# Patient Record
Sex: MALE | Race: OTHER | Employment: STUDENT | URBAN - NONMETROPOLITAN AREA
[De-identification: names, ages, dates, MRNs, and addresses within clinical notes are randomized per-mention and may not be internally consistent; named-entity substitution may affect disease eponyms.]

---
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
## 2019-07-17 ENCOUNTER — TELEPHONE (OUTPATIENT)
Dept: FAMILY MEDICINE CLINIC | Facility: CLINIC | Age: 19
End: 2019-07-17

## 2019-07-17 NOTE — TELEPHONE ENCOUNTER
Spoke with pt who requested to have copy of PFT from last year emailed or faxed to him. Advised pt that [facility] are not at liberty to fax or email records from another facility and that he would have to contact [address].  Patient notified and verbalized under

## 2019-07-29 ENCOUNTER — TELEPHONE (OUTPATIENT)
Dept: FAMILY MEDICINE CLINIC | Facility: CLINIC | Age: 19
End: 2019-07-29

## 2019-07-29 DIAGNOSIS — Z53.20 SCREENING FOR MALIGNANT NEOPLASM OF LUNG DECLINED BY PATIENT: Primary | ICD-10-CM

## 2019-07-29 DIAGNOSIS — R05.3 CHRONIC COUGH: ICD-10-CM

## 2019-07-29 DIAGNOSIS — Z00.00 ROUTINE ADULT HEALTH MAINTENANCE: ICD-10-CM

## 2019-07-29 NOTE — TELEPHONE ENCOUNTER
I dont know what code to use    And   I think we can order the challenge alone without the regular pft-----    I would try wellness exam perhaps?

## 2019-07-29 NOTE — TELEPHONE ENCOUNTER
PT NEEDS A REFERRAL TO GET A METHACHOLINE TEST COMPLETED . PT IS TRYING TO JOIN THE , AND THEY TOLD PT HE NEEDED TO GET THIS TEST DONE.  PLEASE CALL

## 2019-07-29 NOTE — TELEPHONE ENCOUNTER
Pt states that he would like to go to [employer].  Pt provided [employer] central scheduling fax number  [phone] faxed to number provided

## 2019-08-08 ENCOUNTER — TELEPHONE (OUTPATIENT)
Dept: FAMILY MEDICINE CLINIC | Facility: CLINIC | Age: 19
End: 2019-08-08

## 2019-08-08 NOTE — TELEPHONE ENCOUNTER
Aura from [facility] pulmonary scheduling called to say that methacholine test needs to be personally signed by Dr. Matt Danielle. Also pt needs other lung function tests done prior. Advised that pt had PFT in 2017.  Neal Watkins states that she will has to talk wit

## 2019-08-29 ENCOUNTER — OFFICE VISIT (OUTPATIENT)
Dept: FAMILY MEDICINE CLINIC | Facility: CLINIC | Age: 19
End: 2019-08-29
Payer: COMMERCIAL

## 2019-08-29 VITALS
TEMPERATURE: 98 F | WEIGHT: 192.25 LBS | HEART RATE: 68 BPM | HEIGHT: 71 IN | RESPIRATION RATE: 12 BRPM | SYSTOLIC BLOOD PRESSURE: 120 MMHG | DIASTOLIC BLOOD PRESSURE: 80 MMHG | BODY MASS INDEX: 26.91 KG/M2

## 2019-08-29 DIAGNOSIS — Z00.8 ENCOUNTER FOR PULMONARY FUNCTION TESTING: Primary | ICD-10-CM

## 2019-08-29 DIAGNOSIS — Z87.09 PERSONAL HISTORY OF ASTHMA: ICD-10-CM

## 2019-08-29 PROCEDURE — 99213 OFFICE O/P EST LOW 20 MIN: CPT | Performed by: FAMILY MEDICINE

## 2019-09-03 ENCOUNTER — TELEPHONE (OUTPATIENT)
Dept: FAMILY MEDICINE CLINIC | Facility: CLINIC | Age: 19
End: 2019-09-03

## 2019-09-03 DIAGNOSIS — R05.3 CHRONIC COUGH: Primary | ICD-10-CM

## 2019-09-03 NOTE — TELEPHONE ENCOUNTER
Spoke with Terrence Leon at [facility] respiratory.  Pt needs to have a PFT within 6 months to have methacholine challenge test    PFT pended with chronic cough as dx

## 2019-09-03 NOTE — TELEPHONE ENCOUNTER
I ordered but patient was trying to do the challenge without the p-f-t    We will have to tell him it cant be done that way

## 2019-09-03 NOTE — PROGRESS NOTES
Radha Guillory is a 20year old male. Patient presents with: Other: breathing test to rule out asthma .  inrm 6      Chief Complaint Reviewed and Verified  Nursing Notes Reviewed and   Verified  Tobacco Reviewed  Allergies Reviewed  Medications Reviewed complaints  RESPIRATORY: denies shortness of breath with exertion  NEURO: denies headaches     Objective   EXAM:   /80 (BP Location: Right arm, Patient Position: Sitting, Cuff Size: adult)   Pulse 68   Temp 97.8 °F (36.6 °C) (Temporal)   Resp 12   Ht

## 2019-10-23 ENCOUNTER — TELEPHONE (OUTPATIENT)
Dept: FAMILY MEDICINE CLINIC | Facility: CLINIC | Age: 19
End: 2019-10-23

## 2019-10-23 NOTE — TELEPHONE ENCOUNTER
Pt. Asking for letter stating he only needed the inhaler when he was young and no uses it. Please call pt.

## 2019-10-23 NOTE — TELEPHONE ENCOUNTER
Pt is requesting a letter advising that he has not used or been prescribed an inhaler since 2016    [facility] for letter

## 2019-10-23 NOTE — TELEPHONE ENCOUNTER
Yes, if this is true that we have not sent in an inhaler or refill of inhaler,, we can send a letter stating that.

## 2020-01-20 ENCOUNTER — OFFICE VISIT (OUTPATIENT)
Dept: FAMILY MEDICINE CLINIC | Facility: CLINIC | Age: 20
End: 2020-01-20
Payer: COMMERCIAL

## 2020-01-20 VITALS
TEMPERATURE: 98 F | WEIGHT: 197 LBS | BODY MASS INDEX: 27.58 KG/M2 | RESPIRATION RATE: 12 BRPM | SYSTOLIC BLOOD PRESSURE: 110 MMHG | DIASTOLIC BLOOD PRESSURE: 60 MMHG | HEIGHT: 71 IN | HEART RATE: 60 BPM

## 2020-01-20 DIAGNOSIS — Z71.1 PERSON WITH FEARED COMPLAINT IN WHOM NO DIAGNOSIS IS MADE: Primary | ICD-10-CM

## 2020-01-20 PROCEDURE — 99213 OFFICE O/P EST LOW 20 MIN: CPT | Performed by: FAMILY MEDICINE

## 2020-01-20 NOTE — PROGRESS NOTES
Adam Ragsdale is a [address] old male. Patient presents with:  Consult: inrm.  6      Chief Complaint Reviewed and Verified  Nursing Notes Reviewed and   Verified  Tobacco Reviewed  Allergies Reviewed  Medications Reviewed    Problem List Reviewed  Medi rashes  RESPIRATORY: denies shortness of breath with exertion  Uses no medications    NEVER DIAGNOSED ASTHMA    CARDIOVASCULAR: denies chest pain on exertion  GI: denies abdominal pain and denies heartburn  NEURO: denies headaches     Objective   EXAM:   B

## 2020-08-19 ENCOUNTER — OFFICE VISIT (OUTPATIENT)
Dept: FAMILY MEDICINE CLINIC | Facility: CLINIC | Age: 20
End: 2020-08-19
Payer: COMMERCIAL

## 2020-08-19 VITALS
TEMPERATURE: 98 F | HEART RATE: 91 BPM | BODY MASS INDEX: 29.4 KG/M2 | WEIGHT: 210 LBS | HEIGHT: 71 IN | DIASTOLIC BLOOD PRESSURE: 80 MMHG | OXYGEN SATURATION: 98 % | SYSTOLIC BLOOD PRESSURE: 120 MMHG | RESPIRATION RATE: 12 BRPM

## 2020-08-19 DIAGNOSIS — Z71.1 PERSON WITH FEARED COMPLAINT IN WHOM NO DIAGNOSIS IS MADE: Primary | ICD-10-CM

## 2020-08-19 PROCEDURE — 99213 OFFICE O/P EST LOW 20 MIN: CPT | Performed by: FAMILY MEDICINE

## 2020-08-19 PROCEDURE — 3074F SYST BP LT 130 MM HG: CPT | Performed by: FAMILY MEDICINE

## 2020-08-19 PROCEDURE — 3008F BODY MASS INDEX DOCD: CPT | Performed by: FAMILY MEDICINE

## 2020-08-19 PROCEDURE — 3079F DIAST BP 80-89 MM HG: CPT | Performed by: FAMILY MEDICINE

## 2020-08-20 NOTE — PROGRESS NOTES
Dameon Marie is a 23year old male.   Patient presents with:  Consult: pt is requesting to remove GERD from history list . inrm 5      Chief Complaint Reviewed and Verified  Nursing Notes Reviewed and   Verified  Tobacco Reviewed  Allergies Reviewed kg) (95 %, Z= 1.65)*  06/21/16 : 197 lb 4 oz (89.5 kg) (95 %, Z= 1.66)*  11/04/15 : 184 lb 2 oz (83.5 kg) (93 %, Z= 1.49)*    * Growth percentiles are based on [facility] (Boys, 2-20 Years) data.     REVIEW OF SYSTEMS:   GENERAL HEALTH: feels well no complaints  S

## 2023-11-22 ENCOUNTER — PATIENT OUTREACH (OUTPATIENT)
Dept: CASE MANAGEMENT | Age: 23
End: 2023-11-22

## 2023-11-22 NOTE — PROCEDURES
The office order for PCP removal request is Approved and finalized on November 22, 2023.     Thanks,  [facility] [employer]

## (undated) NOTE — LETTER
10/23/19              To whom it may concern,               Please be advised that Joni Cisneros (: 2000) is under my care and has not been     prescribed or refilled his albuterol inhaler since 2016.                       Izabella Petersen

## (undated) NOTE — LETTER
[address]  [phone]          Date: 8/19/2020      Patient Name: Chele Ballard            To Whom it may concern:      Patient seen by me for the last

## (undated) NOTE — LETTER
[facility] [id number]  [phone]          Date: 2020      Patient Name: Mahogany Osmanhoang [address] [place] [phone]    : 2000

## (undated) NOTE — LETTER
[facility] [phone]  [phone]          Date: 2020      Patient Name: Deejay Moraes [address] [place] [phone]  : 2000